# Patient Record
Sex: MALE | URBAN - METROPOLITAN AREA
[De-identification: names, ages, dates, MRNs, and addresses within clinical notes are randomized per-mention and may not be internally consistent; named-entity substitution may affect disease eponyms.]

---

## 2020-10-15 ENCOUNTER — OFFICE VISIT (OUTPATIENT)
Dept: URBAN - METROPOLITAN AREA CLINIC 13 | Facility: CLINIC | Age: 70
End: 2020-10-15
Payer: MEDICARE

## 2020-10-15 DIAGNOSIS — H43.812 VITREOUS DEGENERATION, LEFT EYE: ICD-10-CM

## 2020-10-15 PROCEDURE — 99204 OFFICE O/P NEW MOD 45 MIN: CPT | Performed by: OPHTHALMOLOGY

## 2020-10-15 RX ORDER — OFLOXACIN 3 MG/ML
0.3 % SOLUTION/ DROPS OPHTHALMIC
Qty: 5 | Refills: 0 | Status: INACTIVE
Start: 2020-10-15 | End: 2021-01-13

## 2020-10-15 RX ORDER — PREDNISOLONE ACETATE 10 MG/ML
1 % SUSPENSION/ DROPS OPHTHALMIC
Qty: 5 | Refills: 3 | Status: INACTIVE
Start: 2020-10-15 | End: 2021-01-13

## 2020-10-15 ASSESSMENT — INTRAOCULAR PRESSURE
OS: 16
OD: 16

## 2020-10-15 NOTE — IMPRESSION/PLAN
Impression: Puckering of macula, left eye: H35.372. Plan: There is an epiretinal membrane (ERM). OCT shows VMA and possible early macular hole OD and ERM/CME OS. We discussed the natural history as well as the risk and benefits of observation versus vitrectomy with membrane peeling. The patient understands that with vitrectomy, the vision can improve, but does not improve fully and sometimes the vision does not improve at all. Also, it can take months to years for the vision to improve. The risks of vitrectomy include but are not limited to infection, retinal tear or detachment, glaucoma, cataract formation in a phakic patient, hemorrhage, loss of eye and blindness, recurrent epiretinal membranes, need for additional surgery, and chronic cystoid macular edema. The patient elects to proceed with vitrectomy surgery. He understands I cannot guarantee vision improvement. thanks [employer] PLAN: 25G PPV, MP, ICG, ILM PEEL - OS

## 2020-10-15 NOTE — IMPRESSION/PLAN
Impression: Vitreous degeneration, left eye: H43.812. Plan: The patient has a Posterior Vitreous Detachment (PVD). At this time, no retinal tear or retinal detachment is identified. We discussed the natural history of a PVD. Retinal detachment symptoms were reviewed. Patient was encouraged to call our office if there is an increase in floaters, decrease in vision, or a shadow or curtain is noted in their peripheral vision.

## 2020-10-17 ENCOUNTER — OFFICE VISIT (OUTPATIENT)
Dept: URBAN - METROPOLITAN AREA CLINIC 7 | Facility: CLINIC | Age: 70
End: 2020-10-17
Payer: MEDICARE

## 2020-10-17 DIAGNOSIS — Z96.1 PRESENCE OF PSEUDOPHAKIA: ICD-10-CM

## 2020-10-17 PROCEDURE — 92014 COMPRE OPH EXAM EST PT 1/>: CPT | Performed by: OPHTHALMOLOGY

## 2020-10-17 ASSESSMENT — INTRAOCULAR PRESSURE
OD: 16
OS: 17

## 2020-10-17 NOTE — IMPRESSION/PLAN
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 Bilateral.
Acute PVD OD Chronic PVD OS Plan: Reports new floaters in right eye. Scleral depressed exam reveals no retinal tear or retinal detachment. Reviewed signs and symptoms of a retinal tear and detachment. Advised to return immediately for new floaters, flashing lights or a shadow in the vision. Patient wishes to return to see Dr. Josee Santos prior to the surgery in the left eye to discuss the condition in the right eye. 

Return in 1 week

## 2020-10-17 NOTE — IMPRESSION/PLAN
Impression: Puckering of macula, left eye: H35.372. Plan: There is an epiretinal membrane (ERM). OCT shows VMA and possible early macular hole OD and ERM/CME OS. We discussed the natural history as well as the risk and benefits of observation versus vitrectomy with membrane peeling. The patient understands that with vitrectomy, the vision can improve, but does not improve fully and sometimes the vision does not improve at all. Also, it can take months to years for the vision to improve. The risks of vitrectomy include but are not limited to infection, retinal tear or detachment, glaucoma, cataract formation in a phakic patient, hemorrhage, loss of eye and blindness, recurrent epiretinal membranes, need for additional surgery, and chronic cystoid macular edema. The patient elects to proceed with vitrectomy surgery. He understands I cannot guarantee vision improvement.  

PLAN: 25G PPV, MP, ICG, ILM PEEL - OS with DTG

## 2020-10-23 ENCOUNTER — OFFICE VISIT (OUTPATIENT)
Dept: URBAN - METROPOLITAN AREA CLINIC 35 | Facility: CLINIC | Age: 70
End: 2020-10-23
Payer: MEDICARE

## 2020-10-23 DIAGNOSIS — H43.813 VITREOUS DEGENERATION, BILATERAL: ICD-10-CM

## 2020-10-23 PROCEDURE — 92012 INTRM OPH EXAM EST PATIENT: CPT | Performed by: OPHTHALMOLOGY

## 2020-10-23 PROCEDURE — 92134 CPTRZ OPH DX IMG PST SGM RTA: CPT | Performed by: OPHTHALMOLOGY

## 2020-10-23 ASSESSMENT — INTRAOCULAR PRESSURE
OD: 16
OS: 17

## 2020-10-23 NOTE — IMPRESSION/PLAN
Impression: Puckering of macula, left eye: H35.372. Plan: There is an epiretinal membrane (ERM). OCT 10/15/20  shows resolved VMA OD and ERM/CME OS. We discussed the natural history as well as the risk and benefits of observation versus vitrectomy with membrane peeling. The patient understands that with vitrectomy, the vision can improve, but does not improve fully and sometimes the vision does not improve at all. Also, it can take months to years for the vision to improve. The risks of vitrectomy include but are not limited to infection, retinal tear or detachment, glaucoma, cataract formation in a phakic patient, hemorrhage, loss of eye and blindness, recurrent epiretinal membranes, need for additional surgery, and chronic cystoid macular edema. The patient elects to proceed with vitrectomy surgery. He understands I cannot guarantee vision improvement. thanks [employer] PLAN: 25G PPV, MP, ICG, ILM PEEL - OS

## 2020-10-30 ENCOUNTER — SURGERY (OUTPATIENT)
Dept: URBAN - METROPOLITAN AREA EXTERNAL CLINIC 26 | Facility: EXTERNAL CLINIC | Age: 70
End: 2020-10-30
Payer: MEDICARE

## 2020-10-30 PROCEDURE — 67042 VIT FOR MACULAR HOLE: CPT | Performed by: OPHTHALMOLOGY

## 2020-10-31 ENCOUNTER — Encounter (OUTPATIENT)
Dept: URBAN - METROPOLITAN AREA CLINIC 7 | Facility: CLINIC | Age: 70
End: 2020-10-31
Payer: MEDICARE

## 2020-10-31 PROCEDURE — 99024 POSTOP FOLLOW-UP VISIT: CPT | Performed by: OPHTHALMOLOGY

## 2020-11-03 ENCOUNTER — POST-OPERATIVE VISIT (OUTPATIENT)
Dept: URBAN - METROPOLITAN AREA CLINIC 7 | Facility: CLINIC | Age: 70
End: 2020-11-03
Payer: MEDICARE

## 2020-11-03 PROCEDURE — 99024 POSTOP FOLLOW-UP VISIT: CPT | Performed by: OPHTHALMOLOGY

## 2020-11-03 ASSESSMENT — INTRAOCULAR PRESSURE
OD: 21
OS: 36

## 2020-11-03 NOTE — IMPRESSION/PLAN
Impression: S/P 25G PPV, MP, ICG, ILM PEEL OS - 4 Days. Puckering of macula, left eye  H35.372. Plan: doing well. taper off PF
start [place].  
RTC 1 week IOP check; if ok RTC 1 month OCT OU; dilate OU --Taper Prednisolone acetate 1% TID x 1 wk, BID x 1wk, QD x 1wk, then d/c--Discontinue Ocuflox on friday

## 2020-11-11 ENCOUNTER — TESTING ONLY (OUTPATIENT)
Dept: URBAN - METROPOLITAN AREA CLINIC 7 | Facility: CLINIC | Age: 70
End: 2020-11-11
Payer: MEDICARE

## 2020-11-11 DIAGNOSIS — H35.372 PUCKERING OF MACULA, LEFT EYE: Primary | ICD-10-CM

## 2020-11-11 PROCEDURE — 99211 OFF/OP EST MAY X REQ PHY/QHP: CPT | Performed by: OPHTHALMOLOGY

## 2020-11-11 PROCEDURE — 99024 POSTOP FOLLOW-UP VISIT: CPT | Performed by: OPHTHALMOLOGY

## 2020-11-11 ASSESSMENT — INTRAOCULAR PRESSURE
OD: 12
OS: 16

## 2020-12-02 ENCOUNTER — POST-OPERATIVE VISIT (OUTPATIENT)
Dept: URBAN - METROPOLITAN AREA CLINIC 7 | Facility: CLINIC | Age: 70
End: 2020-12-02
Payer: MEDICARE

## 2020-12-02 PROCEDURE — 99024 POSTOP FOLLOW-UP VISIT: CPT | Performed by: OPHTHALMOLOGY

## 2020-12-02 PROCEDURE — 92012 INTRM OPH EXAM EST PATIENT: CPT | Performed by: OPHTHALMOLOGY

## 2020-12-02 ASSESSMENT — INTRAOCULAR PRESSURE
OS: 24
OD: 26

## 2020-12-02 NOTE — IMPRESSION/PLAN
Impression: S/P 25G PPV, MP, ICG, ILM PEEL OS - 33 Days Plan: Exam/OCT OS show post-surgical macula w/o CME, VA improving. Observe. Exam/OCT OD show visually significant floater and normal foveal contour. Pt states that floaters are affecting his ADLs. Discussed options of observation vs PPV, pt elects PPV. RBA reviewed at length, consent signed for Sx with DTG. 

25g PPV/poss EL/poss Gas OD (DTG)

## 2020-12-15 ENCOUNTER — SURGERY (OUTPATIENT)
Dept: URBAN - METROPOLITAN AREA EXTERNAL CLINIC 26 | Facility: EXTERNAL CLINIC | Age: 70
End: 2020-12-15
Payer: MEDICARE

## 2020-12-15 PROCEDURE — 67036 REMOVAL OF INNER EYE FLUID: CPT | Performed by: OPHTHALMOLOGY

## 2020-12-16 ENCOUNTER — POST-OPERATIVE VISIT (OUTPATIENT)
Dept: URBAN - METROPOLITAN AREA CLINIC 7 | Facility: CLINIC | Age: 70
End: 2020-12-16
Payer: MEDICARE

## 2020-12-16 PROCEDURE — 99024 POSTOP FOLLOW-UP VISIT: CPT | Performed by: OPHTHALMOLOGY

## 2020-12-16 ASSESSMENT — INTRAOCULAR PRESSURE
OS: 24
OD: 17

## 2020-12-16 NOTE — IMPRESSION/PLAN
Impression: S/P 25g PPV/poss EL/poss Gas OD OD - 1 Day. Bilateral vitreous detachment of eyes  H43.813. Plan: Doing well. PF/Oflox QID. Gas precautions.  

RTC 1 week DFE OD DTG

## 2020-12-23 ENCOUNTER — POST-OPERATIVE VISIT (OUTPATIENT)
Dept: URBAN - METROPOLITAN AREA CLINIC 7 | Facility: CLINIC | Age: 70
End: 2020-12-23
Payer: MEDICARE

## 2020-12-23 PROCEDURE — 99024 POSTOP FOLLOW-UP VISIT: CPT | Performed by: OPHTHALMOLOGY

## 2020-12-23 ASSESSMENT — INTRAOCULAR PRESSURE
OD: 44
OS: 24

## 2020-12-23 NOTE — IMPRESSION/PLAN
Impression: S/P 25g PPV/poss EL/poss Gas OD OD - 8 Days. Plan: Doing well, but IOP grossly elevated. Reduce PF to BID OD x1 week, then stop. Start Combigan OU BID. 

1 week, IOP check

## 2020-12-30 ENCOUNTER — TESTING ONLY (OUTPATIENT)
Dept: URBAN - METROPOLITAN AREA CLINIC 7 | Facility: CLINIC | Age: 70
End: 2020-12-30
Payer: MEDICARE

## 2020-12-30 PROCEDURE — 99211 OFF/OP EST MAY X REQ PHY/QHP: CPT | Performed by: OPHTHALMOLOGY

## 2020-12-30 PROCEDURE — 99024 POSTOP FOLLOW-UP VISIT: CPT | Performed by: OPHTHALMOLOGY

## 2020-12-30 ASSESSMENT — INTRAOCULAR PRESSURE
OS: 14
OD: 26

## 2021-01-13 ENCOUNTER — POST-OPERATIVE VISIT (OUTPATIENT)
Dept: URBAN - METROPOLITAN AREA CLINIC 7 | Facility: CLINIC | Age: 71
End: 2021-01-13
Payer: MEDICARE

## 2021-01-13 PROCEDURE — 99024 POSTOP FOLLOW-UP VISIT: CPT | Performed by: OPHTHALMOLOGY

## 2021-01-13 ASSESSMENT — INTRAOCULAR PRESSURE
OD: 12
OS: 9

## 2021-01-13 NOTE — IMPRESSION/PLAN
Impression: S/P 25g PPV/poss EL/poss Gas OD OD - 29 Days. Vitreous degeneration, bilateral  H43.813. Plan: Doing well. IOP normal on Combigan QD. OK to stop, recheck in 2-3 weeks. 

2-3 weeks with DTG

## 2021-02-03 ENCOUNTER — POST-OPERATIVE VISIT (OUTPATIENT)
Dept: URBAN - METROPOLITAN AREA CLINIC 7 | Facility: CLINIC | Age: 71
End: 2021-02-03
Payer: MEDICARE

## 2021-02-03 PROCEDURE — 99024 POSTOP FOLLOW-UP VISIT: CPT | Performed by: OPHTHALMOLOGY

## 2021-02-03 ASSESSMENT — INTRAOCULAR PRESSURE
OD: 18
OS: 19

## 2021-02-03 NOTE — IMPRESSION/PLAN
Impression: S/P 25g PPV/poss EL/poss Gas OD OD - 50 Days. Vitreous degeneration, bilateral  H43.813. Plan: Doing well, IOP normal off gtts. 

2 months, OCT OU with DTG

## 2021-04-06 ENCOUNTER — OFFICE VISIT (OUTPATIENT)
Dept: URBAN - METROPOLITAN AREA CLINIC 7 | Facility: CLINIC | Age: 71
End: 2021-04-06
Payer: MEDICARE

## 2021-04-06 DIAGNOSIS — H35.81 RETINAL EDEMA: ICD-10-CM

## 2021-04-06 PROCEDURE — 92134 CPTRZ OPH DX IMG PST SGM RTA: CPT | Performed by: OPHTHALMOLOGY

## 2021-04-06 PROCEDURE — 99213 OFFICE O/P EST LOW 20 MIN: CPT | Performed by: OPHTHALMOLOGY

## 2021-04-06 ASSESSMENT — INTRAOCULAR PRESSURE
OS: 18
OD: 13

## 2021-04-06 NOTE — IMPRESSION/PLAN
Impression: Retinal edema: H35.81. Left. Plan: He's doing well and happy with his vision s/p PPV OU. He has trace residual edema OS. OCT shows no IRF/SRF OD and s/p ERM removal OS. He will f/u with your excellent care. thanks [employer] RTC PRN